# Patient Record
Sex: MALE | Race: WHITE | NOT HISPANIC OR LATINO | Employment: FULL TIME | URBAN - METROPOLITAN AREA
[De-identification: names, ages, dates, MRNs, and addresses within clinical notes are randomized per-mention and may not be internally consistent; named-entity substitution may affect disease eponyms.]

---

## 2017-12-29 ENCOUNTER — OFFICE VISIT (OUTPATIENT)
Dept: GASTROENTEROLOGY | Facility: CLINIC | Age: 27
End: 2017-12-29

## 2017-12-29 VITALS — WEIGHT: 184 LBS | BODY MASS INDEX: 24.39 KG/M2 | HEIGHT: 73 IN

## 2017-12-29 DIAGNOSIS — R10.10 PAIN OF UPPER ABDOMEN: Primary | ICD-10-CM

## 2017-12-29 PROCEDURE — 99204 OFFICE O/P NEW MOD 45 MIN: CPT | Performed by: INTERNAL MEDICINE

## 2017-12-29 NOTE — PATIENT INSTRUCTIONS
Continue the ppi medication for the next 3 weeks.  Then take every other day for a week and then stop.  OK to pepcid or zantac twice a day as needed for any breakthrough pain.    Call if symptoms return or worsen and we can check the esophagram test    For any additional questions, concerns or changes to your condition after today's office visit please contact the office at [phone].

## 2017-12-29 NOTE — PROGRESS NOTES
Chief Complaint   Patient presents with   • Abdominal Pain       Subjective     HPI    Nikolas Mireles is a 27 y.o. male with no significant past medical history who presents for evaluation of Abdominal pain.  Symptoms started approximately December 9.  He noticed pain in his upper abdomen, described as between the region of his navel in his rib cage.  All across the upper part of his abdomen.  It gradually got worse.  He came to the point that it was causing him to stop working and have to rest for a bit.  It was coming in waves.  He was seen at the [facility] and was diagnosed with GERD.  He was started on a PPI.  This was on December 18.  At that time, he had lab work showing hemoglobin of 17.7, white count 9.6, platelets of 223.  His glucose was elevated at 143.  His creatinine was 1.12.  His transaminases along with his lipase were normal.  He was sent out on pantoprazole which she has taken since that time.  About a week later, his symptoms started to subside.  He has been on a very limited in bland diet.  He has had a little bit of weight loss with this.  He has been reluctant because he is feeling so good, 2 resume a more regular diet.  He does endorse that he was eating poorly and making poor diet choices with lots of caffeine, fast food, etc.    No family history of GI malignancies.  He is currently not smoking but does struggle with smoking and then quitting intermittently.  He does not drink excess alcohol.  He works in construction.    History reviewed. No pertinent past medical history.    No current outpatient prescriptions on file.    No Known Allergies    Social History     Social History   • Marital status: Single     Spouse name: N/A   • Number of children: N/A   • Years of education: N/A     Occupational History   • Not on file.     Social History Main Topics   • Smoking status: Former Smoker     Quit date: 9/29/2017   • Smokeless tobacco: Never Used   • Alcohol use No      Comment: socially    • Drug use: No   • Sexual activity: Yes     Partners: Female     Birth control/ protection: None     Other Topics Concern   • Not on file     Social History Narrative       History reviewed. No pertinent family history.    Review of Systems   Constitutional: Negative for activity change, appetite change and fatigue.   HENT: Negative for sore throat and trouble swallowing.    Respiratory: Negative.    Cardiovascular: Negative.    Gastrointestinal: Positive for abdominal pain. Negative for abdominal distention, blood in stool, constipation, diarrhea, nausea and vomiting.   Endocrine: Negative for cold intolerance and heat intolerance.   Genitourinary: Negative for difficulty urinating, dysuria and frequency.   Musculoskeletal: Negative for arthralgias, back pain and myalgias.   Hematological: Negative for adenopathy. Does not bruise/bleed easily.   All other systems reviewed and are negative.      Objective     There were no vitals filed for this visit.  Last 2 weights    12/29/17  1524   Weight: 83.5 kg (184 lb)     Body mass index is 24.28 kg/(m^2).    Physical Exam   Constitutional: He is oriented to person, place, and time. He appears well-developed and well-nourished. No distress.   HENT:   Head: Normocephalic and atraumatic.   Right Ear: External ear normal.   Left Ear: External ear normal.   Nose: Nose normal.   Mouth/Throat: Oropharynx is clear and moist.   Eyes: Conjunctivae and EOM are normal. Right eye exhibits no discharge. Left eye exhibits no discharge. No scleral icterus.   Neck: Normal range of motion. Neck supple. No thyromegaly present.   No supraclavicular adenopathy   Cardiovascular: Normal rate, regular rhythm, normal heart sounds and intact distal pulses.  Exam reveals no gallop.    No murmur heard.  No lower extremity edema   Pulmonary/Chest: Effort normal and breath sounds normal. No respiratory distress. He has no wheezes.   Abdominal: Soft. Normal appearance and bowel sounds are normal. He  exhibits no distension and no mass. There is no hepatosplenomegaly. There is no tenderness. There is no rigidity, no rebound and no guarding.   Genitourinary:   Genitourinary Comments: Rectal exam deferred   Musculoskeletal: Normal range of motion. He exhibits no edema or tenderness.   No atrophy of upper or lower extremities.  Normal digits and nails of both hands.   Lymphadenopathy:     He has no cervical adenopathy.   Neurological: He is alert and oriented to person, place, and time. He displays no atrophy. Coordination normal.   Skin: Skin is warm and dry. No rash noted. He is not diaphoretic. No erythema.   Psychiatric: He has a normal mood and affect. His behavior is normal. Judgment and thought content normal.   Vitals reviewed.      No results found for: WBC, RBC, HGB, HCT, MCV, MCH, MCHC, RDW, RDWSD, MPV, PLT, NEUTRORELPCT, LYMPHORELPCT, MONORELPCT, EOSRELPCT, BASORELPCT, AUTOIGPER, NEUTROABS, LYMPHSABS, MONOSABS, EOSABS, BASOSABS, AUTOIGNUM, NRBC    Sodium   Date Value Ref Range Status   11/18/2016 139 136 - 145 mmol/L Final     Potassium   Date Value Ref Range Status   11/18/2016 4.5 3.5 - 5.2 mmol/L Final     Total CO2   Date Value Ref Range Status   11/18/2016 23.9 22.0 - 29.0 mmol/L Final     Chloride   Date Value Ref Range Status   11/18/2016 102 98 - 107 mmol/L Final     Creatinine   Date Value Ref Range Status   11/18/2016 1.21 0.76 - 1.27 mg/dL Final     BUN   Date Value Ref Range Status   11/18/2016 23 (H) 6 - 20 mg/dL Final     BUN/Creatinine Ratio   Date Value Ref Range Status   11/18/2016 19.0 7.0 - 25.0 Final     Calcium   Date Value Ref Range Status   11/18/2016 10.0 8.6 - 10.5 mg/dL Final     eGFR Non  Am   Date Value Ref Range Status   11/18/2016 74 >60 mL/min/1.73 Final     Alkaline Phosphatase   Date Value Ref Range Status   11/18/2016 62 39 - 117 U/L Final     ALT (SGPT)   Date Value Ref Range Status   11/18/2016 13 1 - 41 U/L Final     AST (SGOT)   Date Value Ref Range Status    11/18/2016 24 1 - 40 U/L Final     Total Bilirubin   Date Value Ref Range Status   11/18/2016 0.9 0.1 - 1.2 mg/dL Final     Albumin   Date Value Ref Range Status   11/18/2016 4.90 3.50 - 5.20 g/dL Final     A/G Ratio   Date Value Ref Range Status   11/18/2016 2.3 g/dL Final         Imaging Results (last 7 days)     ** No results found for the last 168 hours. **            No notes on file    Assessment/Plan    1. Abdominal pain: acute episode starting earlier this month.  Suspicious for GERD/dyspepsia and improving on PPI though he is still hesitant to advance his diet.    Plan  -to continue ppi and then wean off with H2 blocker BID prn for any breakthrough or recurrent symptoms  -to slowly advance diet  -advised healthier eating habits  -to call if symptoms return, will check esophagram to assess for hiatal hernia, reflux  -follow up prn    Nikolas was seen today for abdominal pain.    Diagnoses and all orders for this visit:    Periumbilical abdominal pain        I have discussed the above plan with the patient.  They verbalize understanding and are in agreement with the plan.  They have been advised to contact the office for any questions, concerns, or changes related to their health.    Dictated utilizing Dragon dictation

## 2021-02-02 ENCOUNTER — HOSPITAL ENCOUNTER (EMERGENCY)
Facility: HOSPITAL | Age: 31
Discharge: HOME | End: 2021-02-02
Attending: EMERGENCY MEDICINE | Admitting: EMERGENCY MEDICINE

## 2021-02-02 VITALS
BODY MASS INDEX: 26.76 KG/M2 | SYSTOLIC BLOOD PRESSURE: 143 MMHG | WEIGHT: 201.94 LBS | DIASTOLIC BLOOD PRESSURE: 69 MMHG | RESPIRATION RATE: 18 BRPM | HEART RATE: 99 BPM | TEMPERATURE: 99.7 F | HEIGHT: 73 IN | OXYGEN SATURATION: 96 %

## 2021-02-02 DIAGNOSIS — L03.317 CELLULITIS OF RIGHT BUTTOCK: ICD-10-CM

## 2021-02-02 DIAGNOSIS — L02.31 ABSCESS OF RIGHT BUTTOCK: Primary | ICD-10-CM

## 2021-02-02 PROCEDURE — 25010000002 TDAP 5-2.5-18.5 LF-MCG/0.5 SUSPENSION: Performed by: EMERGENCY MEDICINE

## 2021-02-02 PROCEDURE — 96361 HYDRATE IV INFUSION ADD-ON: CPT

## 2021-02-02 PROCEDURE — 90715 TDAP VACCINE 7 YRS/> IM: CPT | Performed by: EMERGENCY MEDICINE

## 2021-02-02 PROCEDURE — 99283 EMERGENCY DEPT VISIT LOW MDM: CPT

## 2021-02-02 PROCEDURE — 99152 MOD SED SAME PHYS/QHP 5/>YRS: CPT

## 2021-02-02 PROCEDURE — 25010000002 PROPOFOL 10 MG/ML EMULSION: Performed by: EMERGENCY MEDICINE

## 2021-02-02 PROCEDURE — 90471 IMMUNIZATION ADMIN: CPT | Performed by: EMERGENCY MEDICINE

## 2021-02-02 PROCEDURE — 96360 HYDRATION IV INFUSION INIT: CPT

## 2021-02-02 RX ORDER — SODIUM CHLORIDE 0.9 % (FLUSH) 0.9 %
10 SYRINGE (ML) INJECTION AS NEEDED
Status: DISCONTINUED | OUTPATIENT
Start: 2021-02-02 | End: 2021-02-03 | Stop reason: HOSPADM

## 2021-02-02 RX ORDER — HYDROCODONE BITARTRATE AND ACETAMINOPHEN 5; 325 MG/1; MG/1
TABLET ORAL
Qty: 15 TABLET | Refills: 0 | Status: SHIPPED | OUTPATIENT
Start: 2021-02-02

## 2021-02-02 RX ORDER — SODIUM CHLORIDE 9 MG/ML
125 INJECTION, SOLUTION INTRAVENOUS CONTINUOUS
Status: DISCONTINUED | OUTPATIENT
Start: 2021-02-02 | End: 2021-02-03 | Stop reason: HOSPADM

## 2021-02-02 RX ORDER — PROPOFOL 10 MG/ML
VIAL (ML) INTRAVENOUS
Status: COMPLETED | OUTPATIENT
Start: 2021-02-02 | End: 2021-02-02

## 2021-02-02 RX ORDER — KETAMINE HCL IN NACL, ISO-OSM 100MG/10ML
50 SYRINGE (ML) INJECTION ONCE
Status: COMPLETED | OUTPATIENT
Start: 2021-02-02 | End: 2021-02-02

## 2021-02-02 RX ORDER — HYDROCODONE BITARTRATE AND ACETAMINOPHEN 5; 325 MG/1; MG/1
1 TABLET ORAL ONCE
Status: COMPLETED | OUTPATIENT
Start: 2021-02-02 | End: 2021-02-02

## 2021-02-02 RX ORDER — MUPIROCIN CALCIUM 20 MG/G
CREAM TOPICAL 3 TIMES DAILY
Qty: 15 G | Refills: 0 | Status: SHIPPED | OUTPATIENT
Start: 2021-02-02

## 2021-02-02 RX ORDER — LIDOCAINE HYDROCHLORIDE AND EPINEPHRINE 10; 10 MG/ML; UG/ML
10 INJECTION, SOLUTION INFILTRATION; PERINEURAL ONCE
Status: COMPLETED | OUTPATIENT
Start: 2021-02-02 | End: 2021-02-02

## 2021-02-02 RX ORDER — PROPOFOL 10 MG/ML
50 VIAL (ML) INTRAVENOUS ONCE
Status: COMPLETED | OUTPATIENT
Start: 2021-02-02 | End: 2021-02-02

## 2021-02-02 RX ADMIN — TETANUS TOXOID, REDUCED DIPHTHERIA TOXOID AND ACELLULAR PERTUSSIS VACCINE, ADSORBED 0.5 ML: 5; 2.5; 8; 8; 2.5 SUSPENSION INTRAMUSCULAR at 20:53

## 2021-02-02 RX ADMIN — PROPOFOL 30 MG: 10 INJECTION, EMULSION INTRAVENOUS at 21:09

## 2021-02-02 RX ADMIN — LIDOCAINE HYDROCHLORIDE AND EPINEPHRINE 10 ML: 10; 10 INJECTION, SOLUTION INFILTRATION; PERINEURAL at 21:02

## 2021-02-02 RX ADMIN — Medication 50 MG: at 21:02

## 2021-02-02 RX ADMIN — PROPOFOL 50 MG: 10 INJECTION, EMULSION INTRAVENOUS at 21:03

## 2021-02-02 RX ADMIN — HYDROCODONE BITARTRATE AND ACETAMINOPHEN 1 TABLET: 5; 325 TABLET ORAL at 22:22

## 2021-02-02 RX ADMIN — SODIUM CHLORIDE 125 ML/HR: 9 INJECTION, SOLUTION INTRAVENOUS at 20:56

## 2021-02-03 NOTE — DISCHARGE INSTRUCTIONS
Continue your Keflex and Bactrim as previously prescribed.    Return to the emergency department with worsening symptoms, uncontrolled pain, inability to tolerate oral liquids, fever greater than 105°F not controlled by Tylenol and ibuprofen or as needed with emergent concerns.

## 2021-02-03 NOTE — ED NOTES
Pt arrives from home with c/o infected cyst on R buttocks. Pt has been taking PO abx for 2 days. States he has been running an intermittent fever since Sunday, highest 100.5. Pt a&ox4, abc's intact, NAD noted, ambulatory to triage.    Patient wearing mask during triage. RN wearing appropraite PPE during triage. Hand hygiene performed.            Audelia Arellano, RN  02/02/21 1634

## 2021-02-03 NOTE — ED PROVIDER NOTES
Incision & Drainage    Date/Time: 2/2/2021 10:06 PM  Performed by: Luis Barker III, PA  Authorized by: Armin Blank MD     Consent:     Consent obtained:  Verbal    Consent given by:  Patient    Risks discussed:  Bleeding, incomplete drainage and infection    Alternatives discussed:  No treatment and delayed treatment  Location:     Location:  Lower extremity    Lower extremity location:  Buttock    Buttock location:  R buttock  Pre-procedure details:     Skin preparation:  Betadine  Sedation:     Sedation type: See Dr. Blank's note for moderate sedation.  Anesthesia (see MAR for exact dosages):     Anesthesia method:  Local infiltration    Local anesthetic:  Lidocaine 1% WITH epi  Procedure type:     Complexity:  Simple  Procedure details:     Incision types:  Stab incision    Incision depth:  Submucosal    Scalpel blade:  11    Wound management:  Probed and deloculated, irrigated with saline and extensive cleaning    Drainage:  Bloody and purulent (There was also some caseous material consistent with a sebaceous cyst)    Drainage amount:  Moderate    Packing materials:  1/2 in iodoform gauze  Post-procedure details:     Patient tolerance of procedure:  Tolerated well, no immediate complications  Comments:      Wound was covered with a 4 x 4 dressing by myself         Luis Barker III, PA  02/02/21 9911

## 2021-02-03 NOTE — ED NOTES
This RN in appropriate ppe while in pt room. Pt wearing mask.        Pratibha Little, RN  02/02/21 0030

## 2021-02-03 NOTE — ED PROVIDER NOTES
EMERGENCY DEPARTMENT ENCOUNTER    Room Number:  20/20  Date of encounter:  2/2/2021  PCP: Provider, No Known  Historian: Patient    Patient was placed in face mask during triage process. Patient was wearing facemask when I entered the room and throughout our encounter. I wore full protective equipment throughout this patient encounter including a face mask, eye protection, and gloves. Hand hygiene was performed before donning protective equipment and again following doffing of PPE after leaving the room.    HPI:  Chief Complaint: Right buttock lesion  A complete HPI/ROS/PMH/PSH/SH/FH are unobtainable due to: N/A   Context: Nikolas Mireles is a 30 y.o. male who presents to the ED c/o swelling erythema and pain from right buttock.  Patient notes that he had a small lesion there for about 10 years but it never bothered him.  3 days ago became inflamed and tender.  He saw urgent care yesterday who started him on Keflex and Bactrim but returned to the same urgent care today as the swelling and discomfort was worsening.  Today they made a small incision and drains small amount of purulent discharge but he noted at home with pain was worse and the erythema seem to be worsening.  He reports that he has had chills.  No nausea vomiting or diarrhea.  No chest pain or shortness of breath.  Unknown date of last tetanus immunization.        PAST MEDICAL HISTORY  Active Ambulatory Problems     Diagnosis Date Noted   • No Active Ambulatory Problems     Resolved Ambulatory Problems     Diagnosis Date Noted   • No Resolved Ambulatory Problems     No Additional Past Medical History         PAST SURGICAL HISTORY  No past surgical history on file.      FAMILY HISTORY  No family history on file.      SOCIAL HISTORY  Social History     Socioeconomic History   • Marital status: Single     Spouse name: Not on file   • Number of children: Not on file   • Years of education: Not on file   • Highest education level: Not on file   Tobacco Use    • Smoking status: Former Smoker     Quit date: 9/29/2017     Years since quitting: 3.3   • Smokeless tobacco: Never Used   Substance and Sexual Activity   • Alcohol use: No     Comment: socially   • Drug use: No   • Sexual activity: Yes     Partners: Female     Birth control/protection: None         ALLERGIES  Patient has no known allergies.        REVIEW OF SYSTEMS  Review of Systems     All systems reviewed and negative except for those discussed in HPI.       PHYSICAL EXAM    I have reviewed the triage vital signs and nursing notes.    ED Triage Vitals [02/02/21 1901]   Temp Heart Rate Resp BP SpO2   99.7 °F (37.6 °C) 114 16 -- 94 %      Temp src Heart Rate Source Patient Position BP Location FiO2 (%)   Tympanic Monitor -- -- --         Physical Exam    Physical Exam   Constitutional: No distress.  Not overtly toxic, however uncomfortable appearing.  HENT:  Head: Normocephalic and atraumatic.   Oropharynx: Mucous membranes are moist.  Mallampati 1  Eyes: No scleral icterus. No conjunctival pallor.  Neck: Painless range of motion noted. Neck supple.   Cardiovascular: Normal rate, regular rhythm and intact distal pulses.  Pulmonary/Chest: No respiratory distress.  No tachypnea or increased work of breathing.     Musculoskeletal: Moves all extremities equally. There is no pedal edema or calf tenderness.   Neurological: Alert.  Baseline strength and sensation noted.   Skin: Skin is pink, warm, and dry. No pallor.   Large erythematous area right lower buttock with induration and fluctuance.  Prior I&D location noted.  No purulent drainage.  No crepitus.  No extension to the perineum.  Psychiatric: Mood and affect normal.   Nursing note and vitals reviewed.    LAB RESULTS  No results found for this or any previous visit (from the past 24 hour(s)).    Ordered the above labs and independently reviewed the results.        RADIOLOGY  No Radiology Exams Resulted Within Past 24 Hours    I ordered the above noted  radiological studies. Reviewed by me and discussed with radiologist.  See dictation for official radiology interpretation.      PROCEDURES    Procedural Sedation    Date/Time: 2/2/2021 9:30 PM  Performed by: Armin Blank MD  Authorized by: Armin Blank MD     Consent:     Consent obtained:  Verbal    Consent given by:  Patient    Risks discussed:  Allergic reaction, inadequate sedation, nausea, respiratory compromise necessitating ventilatory assistance and intubation, prolonged sedation necessitating reversal and prolonged hypoxia resulting in organ damage  Indications:     Procedure performed:  Incision and drainage    Intended level of sedation:  Moderate (conscious sedation)  Pre-sedation assessment:     NPO status caution: urgency dictates proceeding with non-ideal NPO status      ASA classification: class 1 - normal, healthy patient      Neck mobility: normal      Mouth opening:  3 or more finger widths    Thyromental distance:  4 finger widths    Mallampati score:  I - soft palate, uvula, fauces, pillars visible    Pre-sedation assessments completed and reviewed: airway patency    Immediate pre-procedure details:     Reviewed: vital signs      Verified: bag valve mask available, emergency equipment available, intubation equipment available, IV patency confirmed and oxygen available    Procedure details (see MAR for exact dosages):     Sedation:  Propofol    Intra-procedure monitoring:  Blood pressure monitoring, cardiac monitor, continuous pulse oximetry, frequent LOC assessments and frequent vital sign checks    Intra-procedure events: none      Total sedation time (minutes):  13  Post-procedure details:     Attendance: Constant attendance by certified staff until patient recovered      Recovery: Patient returned to pre-procedure baseline      Patient is stable for discharge or admission: yes      Patient tolerance:  Tolerated well, no immediate complications        2005: Bedside ultrasound performed and  interpreted by me.  Indication: Abscess evaluation.  Using the linear probe a greater than 2 cm round subcutaneous collection of mixed density is identified at the area of fluctuance.  The area is noted and marked.      MEDICATIONS GIVEN IN ER    Medications   sodium chloride 0.9 % flush 10 mL (has no administration in time range)   sodium chloride 0.9 % infusion (125 mL/hr Intravenous New Bag 2/2/21 2056)   ketamine hcl syringe solution prefilled syringe 50 mg (50 mg Intravenous Given by Other 2/2/21 2102)   Propofol (DIPRIVAN) injection 50 mg (50 mg Intravenous Given 2/2/21 2103)   lidocaine-EPINEPHrine (XYLOCAINE W/EPI) 1 %-1:[id number] injection 10 mL (10 mL Injection Given 2/2/21 2102)   Tdap (BOOSTRIX) injection 0.5 mL (0.5 mL Intramuscular Given 2/2/21 2053)   Propofol (DIPRIVAN) injection (30 mg Intravenous Given 2/2/21 2109)         PROGRESS, DATA ANALYSIS, CONSULTS, AND MEDICAL DECISION MAKING        All labs have been independently reviewed by me.  All radiology studies have been reviewed by me and discussed with radiologist dictating the report.   EKG's independently viewed and interpreted by me.  Discussion below represents my analysis of pertinent findings related to patient's condition, differential diagnosis, treatment plan and final disposition.      ED Course as of Feb 02 2135   Tue Feb 02, 2021 2022 Bedside ultrasound    [RS]   2023 Reviewed at length need for more invasive I&D.  As patient is generally healthy and likely would not require hospitalization if this I&D can be done in the emergency department, I did offer him procedural sedation versus local anesthesia.  Patient elects for general sedation.  Discussed at length risk benefits and alternatives including but not limited to bleeding, infection, respiratory depression, hypotension, and allergic reaction.  Patient stressed understanding and agreement to proceed with procedure.  Case reviewed with pharmacist.  We have elected ketamine at 0.5  mg/kg and propofol at the same dose for the initial dosing.    [RS]   2130 [address] query complete. Treatment plan to include limited course of prescribed controlled substance.  Risks including addiction, tolerance, sedation, benefits and alternatives presented to patient.    [RS]      ED Course User Index  [RS] Armin Blank MD       AS OF 21:35 EST VITALS:    BP - 123/62  HR - 89  TEMP - 99.7 °F (37.6 °C) (Tympanic)  O2 SATS - 100%        DIAGNOSIS  Final diagnoses:   Abscess of right buttock   Cellulitis of right buttock         DISPOSITION  DISCHARGE    Patient discharged in stable condition.    Reviewed implications of results, diagnosis, meds, responsibility to follow up, warning signs and symptoms of possible worsening, potential complications and reasons to return to ER.    Patient/Family voiced understanding of above instructions.    Discussed plan for discharge, as there is no emergent indication for admission. Patient referred to primary care provider for regular health maintenance. Pt/family is agreeable and understands need for follow up and possible repeat testing.  Pt is aware that discharge does not mean that nothing is wrong but it indicates no emergency is present that requires admission and they must continue care with follow-up as given below or physician of their choice.     FOLLOW-UP  Your primary care provider    Schedule an appointment as soon as possible for a visit in 3 days  For wound re-check         Medication List      New Prescriptions    HYDROcodone-acetaminophen 5-325 MG per tablet  Commonly known as: NORCO  Take 1 tab by mouth every 6 hours as needed for pain     mupirocin 2 % cream  Commonly known as: BACTROBAN  Apply  topically to the appropriate area as directed 3 (Three) Times a Day.           Where to Get Your Medications      These medications were sent to [facility] AT [facility] [address] - [phone]  -  [phone] FX  [address] [facility] [id number]    Phone: [phone]   · HYDROcodone-acetaminophen 5-325 MG per tablet  · mupirocin 2 % cream            Armin Blank MD  02/02/21 6964